# Patient Record
Sex: FEMALE | Race: WHITE | HISPANIC OR LATINO | Employment: OTHER | URBAN - METROPOLITAN AREA
[De-identification: names, ages, dates, MRNs, and addresses within clinical notes are randomized per-mention and may not be internally consistent; named-entity substitution may affect disease eponyms.]

---

## 2021-09-20 ENCOUNTER — TELEPHONE (OUTPATIENT)
Dept: GASTROENTEROLOGY | Facility: CLINIC | Age: 76
End: 2021-09-20

## 2022-05-06 ENCOUNTER — OFFICE VISIT (OUTPATIENT)
Dept: GASTROENTEROLOGY | Facility: CLINIC | Age: 77
End: 2022-05-06
Payer: MEDICARE

## 2022-05-06 VITALS
HEART RATE: 76 BPM | WEIGHT: 121 LBS | DIASTOLIC BLOOD PRESSURE: 68 MMHG | SYSTOLIC BLOOD PRESSURE: 110 MMHG | BODY MASS INDEX: 20.66 KG/M2 | HEIGHT: 64 IN

## 2022-05-06 DIAGNOSIS — K21.9 GASTROESOPHAGEAL REFLUX DISEASE WITHOUT ESOPHAGITIS: Primary | ICD-10-CM

## 2022-05-06 DIAGNOSIS — R14.0 ABDOMINAL BLOATING: ICD-10-CM

## 2022-05-06 PROCEDURE — 99203 OFFICE O/P NEW LOW 30 MIN: CPT | Performed by: PHYSICIAN ASSISTANT

## 2022-05-06 RX ORDER — CELECOXIB 100 MG/1
100 CAPSULE ORAL DAILY
COMMUNITY
Start: 2022-04-06

## 2022-05-06 RX ORDER — BUPROPION HYDROCHLORIDE 150 MG/1
150 TABLET ORAL EVERY MORNING
COMMUNITY
Start: 2022-03-27

## 2022-05-06 RX ORDER — CLONAZEPAM 1 MG/1
TABLET ORAL
COMMUNITY

## 2022-05-06 RX ORDER — FLUTICASONE PROPIONATE 50 MCG
SPRAY, SUSPENSION (ML) NASAL
COMMUNITY
Start: 2022-01-28

## 2022-05-06 RX ORDER — MONTELUKAST SODIUM 10 MG/1
TABLET ORAL
COMMUNITY
Start: 2022-04-25

## 2022-05-06 NOTE — LETTER
May 6, 2022     Bud Morel DO  [address]    Patient: Zohra Barrera   YOB: 1945   Date of Visit: 5/6/2022       Dear Dr Agapito Chen: Thank you for referring Zohra Barrera to me for evaluation  Below are my notes for this consultation  If you have questions, please do not hesitate to call me  I look forward to following your patient along with you  Sincerely,        Paulina Gonzales PA-C        CC: No Recipients  Paulina Gonzales, [place]  5/6/2022 12:47 PM  Sign when Signing Visit  [facility] - Outpatient Consultation  Zohra Barrera 78 y o  female MRN: [id number]  Encounter: [id number]          ASSESSMENT AND PLAN:      1  Gastroesophageal reflux disease without esophagitis  2  Abdominal bloating  -GERD diet given  -IBS diet given   -Low FODMAP diet given  -Patient will utilize Pepcid for the next 2 weeks consistently     -No plans for any endoscopic evaluation at this time  ______________________________________________________________________    HPI:    78year old female who presents for GI consultation  Patient presents today to establish care  She reports she has suffered on and off with gastrointestinal problems for quite some time  She reports the most recent flare up that she had had was dating back to June of 2021  She reports that time she started with constipation issues  She did undergo an upper endoscopy in the fall of 2021 which is with centrally a negative test   She reports a normal colonoscopy 2019  She reports that she has had aspiration pneumonia 4 times within the past several months treated with antibiotics  She reports she does follow with nutritionist and does take a probiotic daily  She also reports daily fiber  She reports that from January till present she was actually doing quite well up until 1 week ago when she started with GI symptoms  She reports she had a GI flu    She reports that eating triggered her symptoms  She denies any significant constipation or diarrhea at that time  Patient denies any alarm symptoms  REVIEW OF SYSTEMS:    CONSTITUTIONAL: Denies any fever, chills, rigors, and weight loss  HEENT: No earache or tinnitus  Denies hearing loss or visual disturbances  CARDIOVASCULAR: No chest pain or palpitations  RESPIRATORY: Denies any cough, hemoptysis, shortness of breath or dyspnea on exertion  GASTROINTESTINAL: As noted in the History of Present Illness  GENITOURINARY: No problems with urination  Denies any hematuria or dysuria  NEUROLOGIC: No dizziness or vertigo, denies headaches  MUSCULOSKELETAL: Denies any muscle or joint pain  SKIN: Denies skin rashes or itching  ENDOCRINE: Denies excessive thirst  Denies intolerance to heat or cold  PSYCHOSOCIAL: Denies depression or anxiety  Denies any recent memory loss  Historical Information   History reviewed  No pertinent past medical history  Past Surgical History:   Procedure Laterality Date    APPENDECTOMY      FOOT SURGERY      TONSILLECTOMY       Social History   Social History     Substance and Sexual Activity   Alcohol Use Yes    Comment: social     Social History     Substance and Sexual Activity   Drug Use Never     Social History     Tobacco Use   Smoking Status Former Smoker   Smokeless Tobacco Never Used     History reviewed  No pertinent family history  Meds/Allergies       Current Outpatient Medications:     Breo Ellipta 200-25 MCG/INH inhaler    buPROPion (WELLBUTRIN XL) 150 mg 24 hr tablet    fluticasone (FLONASE) 50 mcg/act nasal spray    montelukast (SINGULAIR) 10 mg tablet    celecoxib (CeleBREX) 100 mg capsule    clonazePAM (KlonoPIN) 1 mg tablet    Not on File        Objective     Blood pressure 110/68, pulse 76, height 5' 4" (1 626 m), weight 54 9 kg (121 lb)  Body mass index is 20 77 kg/m²          PHYSICAL EXAM:      General Appearance:   Alert, cooperative, no distress HEENT:   Normocephalic, atraumatic, anicteric      Neck:  Supple, symmetrical, trachea midline   Lungs:   Clear to auscultation bilaterally; no rales, rhonchi or wheezing; respirations unlabored    Heart[de-identified]   Regular rate and rhythm; no murmur, rub, or gallop  Abdomen:   Soft, non-tender, non-distended; normal bowel sounds; no masses, no organomegaly    Genitalia:   Deferred    Rectal:   Deferred    Extremities:  No cyanosis, clubbing or edema    Pulses:  2+ and symmetric    Skin:  No jaundice, rashes, or lesions    Lymph nodes:  No palpable cervical lymphadenopathy        Lab Results:   No visits with results within 1 Day(s) from this visit  Latest known visit with results is:   No results found for any previous visit  Radiology Results:   No results found

## 2022-05-06 NOTE — PATIENT INSTRUCTIONS
Abdominal Pain   WHAT YOU NEED TO KNOW:   Abdominal pain can be dull, achy, or sharp  You may have pain in one area of your abdomen, or in your entire abdomen  Your pain may be caused by a condition such as constipation, food sensitivity or poisoning, infection, or a blockage  Abdominal pain can also be from a hernia, appendicitis, or an ulcer  Liver, gallbladder, or kidney conditions can also cause abdominal pain  The cause of your abdominal pain may not be known  DISCHARGE INSTRUCTIONS:   Call your local emergency number (911 in the [facility]) if:   · You have new chest pain or shortness of breath  Return to the emergency department if:   · You have pulsing pain in your upper abdomen or lower back that suddenly becomes constant  · Your pain is in the right lower abdominal area and worsens with movement  · You have a fever over 100 4°F (38°C) or shaking chills  · You are vomiting and cannot keep food or liquids down  · Your pain does not improve or gets worse over the next 8 to 12 hours  · You see blood in your vomit or bowel movements, or they look black and tarry  · Your skin or the whites of your eyes turn yellow  · You are a woman and have a large amount of vaginal bleeding that is not your monthly period  Call your doctor if:   · You have pain in your lower back  · You are a man and have pain in your testicles  · You have pain when you urinate  · You have questions or concerns about your condition or care  Medicines:   · Prescription pain medicine  may be given  Ask your healthcare provider how to take this medicine safely  Some prescription pain medicines contain acetaminophen  Do not take other medicines that contain acetaminophen without talking to your healthcare provider  Too much acetaminophen may cause liver damage  Prescription pain medicine may cause constipation  Ask your healthcare provider how to prevent or treat constipation       · Medicines  may be given to calm your stomach or prevent vomiting  · Take your medicine as directed  Contact your healthcare provider if you think your medicine is not helping or if you have side effects  Tell him of her if you are allergic to any medicine  Keep a list of the medicines, vitamins, and herbs you take  Include the amounts, and when and why you take them  Bring the list or the pill bottles to follow-up visits  Carry your medicine list with you in case of an emergency  Manage your symptoms:   · Apply heat  on your abdomen for 20 to 30 minutes every 2 hours for as many days as directed  Heat helps decrease pain and muscle spasms  · Make changes to the food you eat, if needed  Do not eat foods that cause abdominal pain or other symptoms  Eat small meals more often  The following changes may also help:    ? Eat more high-fiber foods if you are constipated  High-fiber foods include fruits, vegetables, whole-grain foods, and legumes  ? Do not eat foods that cause gas if you have bloating  Examples include broccoli, cabbage, and cauliflower  Do not drink soda or carbonated drinks  These may also cause gas  ? Do not eat foods or drinks that contain sorbitol or fructose if you have diarrhea and bloating  Some examples are fruit juices, candy, jelly, and sugar-free gum  ? Do not eat high-fat foods  Examples include fried foods, cheeseburgers, hot dogs, and desserts  ? Limit or do not have caffeine  Caffeine may make symptoms such as heartburn or nausea worse  ? Drink more liquids to prevent dehydration from diarrhea or vomiting  Ask your healthcare provider how much liquid to drink each day and which liquids are best for you  · Keep a diary of your abdominal pain  A diary may help your healthcare provider learn what is causing your abdominal pain  Include when the pain happens, how long it lasts, and what the pain feels like  Write down any other symptoms you have with abdominal pain   Also write down what you eat, and what symptoms you have after you eat  · Manage your stress  Stress may cause abdominal pain  Your healthcare provider may recommend relaxation techniques and deep breathing exercises to help decrease your stress  Your healthcare provider may recommend you talk to someone about your stress or anxiety, such as a counselor or a trusted friend  Get plenty of sleep and exercise regularly  · Limit or do not drink alcohol  Alcohol can make your abdominal pain worse  Ask your healthcare provider if it is safe for you to drink alcohol  Also ask how much is safe for you to drink  · Do not smoke  Nicotine and other chemicals in cigarettes can damage your esophagus and stomach  Ask your healthcare provider for information if you currently smoke and need help to quit  E-cigarettes or smokeless tobacco still contain nicotine  Talk to your healthcare provider before you use these products  Follow up with your doctor within 24 hours or as directed:  Write down your questions so you remember to ask them during your visits  © [employer] 2022 Information is for End User's use only and may not be sold, redistributed or otherwise used for commercial purposes  All illustrations and images included in CareNotes® are the copyrighted property of [employer]  or [facility] Karin Schultz   The above information is an  only  It is not intended as medical advice for individual conditions or treatments  Talk to your doctor, nurse or pharmacist before following any medical regimen to see if it is safe and effective for you

## 2022-05-06 NOTE — PROGRESS NOTES
[facility] - Outpatient Consultation  Fahad Farley 78 y o  female MRN: [id number]  Encounter: [id number]          ASSESSMENT AND PLAN:      1  Gastroesophageal reflux disease without esophagitis  2  Abdominal bloating  -GERD diet given  -IBS diet given   -Low FODMAP diet given  -Patient will utilize Pepcid for the next 2 weeks consistently     -No plans for any endoscopic evaluation at this time  ______________________________________________________________________    HPI:    78year old female who presents for GI consultation  Patient presents today to establish care  She reports she has suffered on and off with gastrointestinal problems for quite some time  She reports the most recent flare up that she had had was dating back to June of 2021  She reports that time she started with constipation issues  She did undergo an upper endoscopy in the fall of 2021 which is with centrally a negative test   She reports a normal colonoscopy 2019  She reports that she has had aspiration pneumonia 4 times within the past several months treated with antibiotics  She reports she does follow with nutritionist and does take a probiotic daily  She also reports daily fiber  She reports that from January till present she was actually doing quite well up until 1 week ago when she started with GI symptoms  She reports she had a GI flu  She reports that eating triggered her symptoms  She denies any significant constipation or diarrhea at that time  Patient denies any alarm symptoms  REVIEW OF SYSTEMS:    CONSTITUTIONAL: Denies any fever, chills, rigors, and weight loss  HEENT: No earache or tinnitus  Denies hearing loss or visual disturbances  CARDIOVASCULAR: No chest pain or palpitations  RESPIRATORY: Denies any cough, hemoptysis, shortness of breath or dyspnea on exertion  GASTROINTESTINAL: As noted in the History of Present Illness  GENITOURINARY: No problems with urination  Denies any hematuria or dysuria  NEUROLOGIC: No dizziness or vertigo, denies headaches  MUSCULOSKELETAL: Denies any muscle or joint pain  SKIN: Denies skin rashes or itching  ENDOCRINE: Denies excessive thirst  Denies intolerance to heat or cold  PSYCHOSOCIAL: Denies depression or anxiety  Denies any recent memory loss  Historical Information   History reviewed  No pertinent past medical history  Past Surgical History:   Procedure Laterality Date    APPENDECTOMY      FOOT SURGERY      TONSILLECTOMY       Social History   Social History     Substance and Sexual Activity   Alcohol Use Yes    Comment: social     Social History     Substance and Sexual Activity   Drug Use Never     Social History     Tobacco Use   Smoking Status Former Smoker   Smokeless Tobacco Never Used     History reviewed  No pertinent family history  Meds/Allergies       Current Outpatient Medications:     Breo Ellipta 200-25 MCG/INH inhaler    buPROPion (WELLBUTRIN XL) 150 mg 24 hr tablet    fluticasone (FLONASE) 50 mcg/act nasal spray    montelukast (SINGULAIR) 10 mg tablet    celecoxib (CeleBREX) 100 mg capsule    clonazePAM (KlonoPIN) 1 mg tablet    Not on File        Objective     Blood pressure 110/68, pulse 76, height 5' 4" (1 626 m), weight 54 9 kg (121 lb)  Body mass index is 20 77 kg/m²  PHYSICAL EXAM:      General Appearance:   Alert, cooperative, no distress   HEENT:   Normocephalic, atraumatic, anicteric      Neck:  Supple, symmetrical, trachea midline   Lungs:   Clear to auscultation bilaterally; no rales, rhonchi or wheezing; respirations unlabored    Heart[de-identified]   Regular rate and rhythm; no murmur, rub, or gallop     Abdomen:   Soft, non-tender, non-distended; normal bowel sounds; no masses, no organomegaly    Genitalia:   Deferred    Rectal:   Deferred    Extremities:  No cyanosis, clubbing or edema    Pulses:  2+ and symmetric    Skin:  No jaundice, rashes, or lesions    Lymph nodes:  No palpable cervical lymphadenopathy        Lab Results:   No visits with results within 1 Day(s) from this visit  Latest known visit with results is:   No results found for any previous visit  Radiology Results:   No results found

## 2022-08-22 ENCOUNTER — TELEPHONE (OUTPATIENT)
Dept: GASTROENTEROLOGY | Facility: AMBULARY SURGERY CENTER | Age: 77
End: 2022-08-22

## 2022-08-22 NOTE — TELEPHONE ENCOUNTER
"KETANI: Spoke with patient. History of GERD, abdominal bloating    Patient c/o 2 weeks of \"stomach gurgling\", and episodes of diarrhea. Patient would like to change her diet. Resent diets provided to patient. She will follow-up with her nutritionist next week.   "

## 2022-08-22 NOTE — TELEPHONE ENCOUNTER
Patients GI provider:  Dr. Gonzalez    Number to return call: (   [phone]    Reason for call: Pt calling with diarrhea and digestion issues, looking for advice until her next appt    Scheduled procedure/appointment date if applicable: appt 10-4-22

## 2022-09-29 RX ORDER — ONDANSETRON 8 MG/1
8 TABLET, ORALLY DISINTEGRATING ORAL EVERY 8 HOURS PRN
COMMUNITY
Start: 2022-09-22 | End: 2022-09-29

## 2022-09-29 RX ORDER — ERYTHROMYCIN 5 MG/G
OINTMENT OPHTHALMIC
COMMUNITY
Start: 2022-07-23

## 2022-09-29 RX ORDER — COVID-19 ANTIGEN TEST
KIT MISCELLANEOUS
COMMUNITY
Start: 2022-07-13

## 2022-09-29 RX ORDER — PREDNISONE 20 MG/1
TABLET ORAL
COMMUNITY
Start: 2022-07-10

## 2022-10-04 ENCOUNTER — OFFICE VISIT (OUTPATIENT)
Dept: GASTROENTEROLOGY | Facility: CLINIC | Age: 77
End: 2022-10-04
Payer: MEDICARE

## 2022-10-04 VITALS
SYSTOLIC BLOOD PRESSURE: 115 MMHG | WEIGHT: 121.4 LBS | HEART RATE: 82 BPM | BODY MASS INDEX: 21.51 KG/M2 | HEIGHT: 63 IN | DIASTOLIC BLOOD PRESSURE: 70 MMHG

## 2022-10-04 DIAGNOSIS — K21.9 GASTROESOPHAGEAL REFLUX DISEASE WITHOUT ESOPHAGITIS: Primary | ICD-10-CM

## 2022-10-04 DIAGNOSIS — K58.0 IRRITABLE BOWEL SYNDROME WITH DIARRHEA: ICD-10-CM

## 2022-10-04 PROCEDURE — 99214 OFFICE O/P EST MOD 30 MIN: CPT | Performed by: INTERNAL MEDICINE

## 2022-10-04 NOTE — PROGRESS NOTES
Follow-up Note -  [facility]  Claudetta Duos 1945 78 y o  female     ASSESSMENT @ PLAN:   She is a 79-year-old female with gastroesophageal reflux disease and IBS D with worsened symptoms related to stressors in her life  She has no alarm symptoms  She had endoscopy in [place] in 2021 with reflux esophagitis and negative pathology  She had negative colonoscopy in 2019     1 she is improved on the FODMAP and LEEP diet and will be progressing through this    2 she will continue on her probiotic    3 we can give her Xifaxan course if needed    4 she will take Pepcid intermittently for reflux control    Reason:  GERD and IBS    HPI:  She is a 79-year-old female with GERD and IBS with worsened IBS symptoms over the last few months related to stressors in her life  Her  was diagnosed with metastatic prostate cancer and she is moving back to the [address] [place] [address] from [facility]  She reports increasing day gas and diarrhea  She has improved on the FODMAP diet and then she improved on the LEEP diet  She was originally told to go on the leave diet after a GI evaluation in 2010 with a negative evaluation she saw a nutritionist   This is an anti-inflammatory diet  She also has noticed that her symptoms do act up over the years when she has increased stress  She has no melena hematochezia no weight loss  She had a normal colonoscopy in 2019  She had normal endoscopy with reflux esophagitis in 2021 in [facility]  She has occasional heartburn regurgitation with coffee and alcohol  She has no dysphagia to solids or liquids  Will be moving back to the [address] [place] [address] 1 she sells her house and hamm [employer]  REVIEW OF SYSTEMS:     CONSTITUTIONAL: Denies any fever, chills, or rigors  Good appetite, and no recent weight loss  HEENT: No earache or tinnitus  Denies hearing loss or visual disturbances  CARDIOVASCULAR: No chest pain or palpitations     RESPIRATORY: Denies any cough, hemoptysis, shortness of breath or dyspnea on exertion  GASTROINTESTINAL: As noted in the History of Present Illness  GENITOURINARY: No problems with urination  Denies any hematuria or dysuria  NEUROLOGIC: No dizziness or vertigo, denies headaches  MUSCULOSKELETAL: Denies any muscle or joint pain  SKIN: Denies skin rashes or itching  ENDOCRINE: Denies excessive thirst  Denies intolerance to heat or cold  PSYCHOSOCIAL: Denies depression or anxiety  Denies any recent memory loss  Past Medical History:   Diagnosis Date    COVID 07/04/2022      Past Surgical History:   Procedure Laterality Date    APPENDECTOMY      FOOT SURGERY      TONSILLECTOMY       Social History     Socioeconomic History    Marital status: /Civil Union     Spouse name: Not on file    Number of children: Not on file    Years of education: Not on file    Highest education level: Not on file   Occupational History    Not on file   Tobacco Use    Smoking status: Former Smoker    Smokeless tobacco: Never Used   Vaping Use    Vaping Use: Never used   Substance and Sexual Activity    Alcohol use: Yes     Comment: social    Drug use: Never    Sexual activity: Not on file   Other Topics Concern    Not on file   Social History Narrative    Not on file     Social Determinants of Health     Financial Resource Strain: Not on file   Food Insecurity: Not on file   Transportation Needs: Not on file   Physical Activity: Not on file   Stress: Not on file   Social Connections: Not on file   Intimate Partner Violence: Not on file   Housing Stability: Not on file     History reviewed  No pertinent family history  Patient has no known allergies    Current Outpatient Medications   Medication Sig Dispense Refill    Breo Ellipta 200-25 MCG/INH inhaler Inhale 1 puff daily      buPROPion (WELLBUTRIN XL) 150 mg 24 hr tablet Take 150 mg by mouth every morning      fluticasone (FLONASE) 50 mcg/act nasal spray SPRAY 2 SPRAYS INTO EACH NOSTRIL EVERY DAY      montelukast (SINGULAIR) 10 mg tablet TAKE 1 TABLET BY MOUTH EVERY DAY AT NIGHT      celecoxib (CeleBREX) 100 mg capsule Take 100 mg by mouth daily (Patient not taking: Reported on 10/4/2022)      clonazePAM (KlonoPIN) 1 mg tablet Take by mouth (Patient not taking: Reported on 10/4/2022)      erythromycin (ILOTYCIN) ophthalmic ointment ADMINISTER INTO THE LEFT EYE NIGHTLY FOR 10 DAYS  (Patient not taking: Reported on 10/4/2022)      nirmatrelvir & ritonavir (Paxlovid) tablet therapy pack PLEASE SEE ATTACHED FOR DETAILED DIRECTIONS (Patient not taking: Reported on 10/4/2022)      ondansetron (ZOFRAN-ODT) 8 mg disintegrating tablet Take 8 mg by mouth every 8 (eight) hours as needed      predniSONE 20 mg tablet TAKE 2 TABLETS BY MOUTH EVERY DAY FOR 5 DAYS (Patient not taking: Reported on 10/4/2022)      [employer] At-Home Covid-19 Test KIT Use as directed (Patient not taking: Reported on 10/4/2022)       No current facility-administered medications for this visit  Blood pressure 115/70, pulse 82, height 5' 3" (1 6 m), weight 55 1 kg (121 lb 6 4 oz)  PHYSICAL EXAM:     General Appearance:   Alert, cooperative, no distress, appears stated age    HEENT:   Normocephalic, atraumatic, anicteric      Neck:  Supple, symmetrical, trachea midline, no adenopathy;    thyroid: no enlargement/tenderness/nodules; no carotid  bruit or JVD    Lungs:   Clear to auscultation bilaterally; no rales, rhonchi or wheezing; respirations unlabored    Heart[de-identified]   S1 and S2 normal; regular rate and rhythm; no murmur, rub, or gallop     Abdomen:   Soft, non-tender, non-distended; normal bowel sounds; no masses, no organomegaly    Genitalia:   Deferred    Rectal:   Deferred    Extremities:  No cyanosis, clubbing or edema    Pulses:  2+ and symmetric all extremities    Skin:  Skin color, texture, turgor normal, no rashes or lesions    Lymph nodes:  No palpable cervical, axillary or inguinal lymphadenopathy        No results found for: WBC, HGB, HCT, MCV, PLT  No results found for: GLUCOSE, CALCIUM, NA, K, CO2, CL, BUN, CREATININE  No results found for: ALT, AST, GGT, ALKPHOS, BILITOT  No results found for: INR, PROTIME

## 2023-05-31 ENCOUNTER — NEW PATIENT (OUTPATIENT)
Dept: URBAN - METROPOLITAN AREA CLINIC 33 | Facility: CLINIC | Age: 78
End: 2023-05-31

## 2023-05-31 DIAGNOSIS — H43.813: ICD-10-CM

## 2023-05-31 DIAGNOSIS — H25.13: ICD-10-CM

## 2023-05-31 DIAGNOSIS — H35.363: ICD-10-CM

## 2023-05-31 DIAGNOSIS — H35.371: ICD-10-CM

## 2023-05-31 DIAGNOSIS — Z98.890: ICD-10-CM

## 2023-05-31 DIAGNOSIS — H43.391: ICD-10-CM

## 2023-05-31 PROCEDURE — 92202 OPSCPY EXTND ON/MAC DRAW: CPT

## 2023-05-31 PROCEDURE — 92134 CPTRZ OPH DX IMG PST SGM RTA: CPT

## 2023-05-31 PROCEDURE — 99204 OFFICE O/P NEW MOD 45 MIN: CPT

## 2023-05-31 ASSESSMENT — TONOMETRY
OD_IOP_MMHG: 17
OS_IOP_MMHG: 19

## 2023-05-31 ASSESSMENT — VISUAL ACUITY
OD_SC: 20/25-1
OS_SC: 20/50+2
OS_PH: 20/30+2

## 2024-06-19 ENCOUNTER — FOLLOW UP (OUTPATIENT)
Dept: URBAN - METROPOLITAN AREA CLINIC 33 | Facility: CLINIC | Age: 79
End: 2024-06-19

## 2024-06-19 DIAGNOSIS — Z98.890: ICD-10-CM

## 2024-06-19 DIAGNOSIS — H35.371: ICD-10-CM

## 2024-06-19 DIAGNOSIS — H35.363: ICD-10-CM

## 2024-06-19 DIAGNOSIS — H43.391: ICD-10-CM

## 2024-06-19 DIAGNOSIS — H25.13: ICD-10-CM

## 2024-06-19 DIAGNOSIS — H43.813: ICD-10-CM

## 2024-06-19 PROCEDURE — 92134 CPTRZ OPH DX IMG PST SGM RTA: CPT

## 2024-06-19 PROCEDURE — 92014 COMPRE OPH EXAM EST PT 1/>: CPT

## 2024-06-19 PROCEDURE — 92202 OPSCPY EXTND ON/MAC DRAW: CPT

## 2024-06-19 ASSESSMENT — VISUAL ACUITY
OS_PH: 20/40+1
OS_SC: 20/60+1
OD_SC: 20/30-2

## 2024-06-19 ASSESSMENT — TONOMETRY
OS_IOP_MMHG: 12
OD_IOP_MMHG: 18

## 2025-06-25 ENCOUNTER — FOLLOW UP (OUTPATIENT)
Dept: URBAN - METROPOLITAN AREA CLINIC 33 | Facility: CLINIC | Age: 80
End: 2025-06-25

## 2025-06-25 DIAGNOSIS — H35.363: ICD-10-CM

## 2025-06-25 DIAGNOSIS — H25.13: ICD-10-CM

## 2025-06-25 DIAGNOSIS — H43.813: ICD-10-CM

## 2025-06-25 DIAGNOSIS — Z98.890: ICD-10-CM

## 2025-06-25 DIAGNOSIS — H43.391: ICD-10-CM

## 2025-06-25 DIAGNOSIS — H35.371: ICD-10-CM

## 2025-06-25 PROCEDURE — 92202 OPSCPY EXTND ON/MAC DRAW: CPT

## 2025-06-25 PROCEDURE — 92014 COMPRE OPH EXAM EST PT 1/>: CPT

## 2025-06-25 PROCEDURE — 92134 CPTRZ OPH DX IMG PST SGM RTA: CPT

## 2025-06-25 ASSESSMENT — VISUAL ACUITY
OD_SC: 20/25-1
OS_PH: 20/30-1
OS_SC: 20/50

## 2025-06-25 ASSESSMENT — TONOMETRY
OS_IOP_MMHG: 20
OD_IOP_MMHG: 18